# Patient Record
Sex: MALE | Race: WHITE | NOT HISPANIC OR LATINO | Employment: OTHER | URBAN - METROPOLITAN AREA
[De-identification: names, ages, dates, MRNs, and addresses within clinical notes are randomized per-mention and may not be internally consistent; named-entity substitution may affect disease eponyms.]

---

## 2024-04-29 DIAGNOSIS — Z78.9 NEED FOR FOLLOW-UP BY SOCIAL WORKER: Primary | ICD-10-CM

## 2024-05-04 ENCOUNTER — HOME HEALTH ADMISSION (OUTPATIENT)
Dept: HOME HEALTH SERVICES | Facility: HOME HEALTH | Age: 64
End: 2024-05-04
Payer: MEDICARE

## 2024-05-04 ENCOUNTER — DOCUMENTATION (OUTPATIENT)
Dept: HOME HEALTH SERVICES | Facility: HOME HEALTH | Age: 64
End: 2024-05-04
Payer: MEDICARE

## 2024-05-04 NOTE — HH CARE COORDINATION
Home Care received a Referral for Nursing, Physical Therapy, Occupational Therapy, and Speech Language Pathology. We have processed the referral for a Start of Care on 5/7-5/8.     If you have any questions or concerns, please feel free to contact us at [phone]. Follow the prompts, enter your five digit zip code, and you will be directed to your care team on [facility].

## 2024-05-14 ENCOUNTER — OFFICE VISIT (OUTPATIENT)
Dept: PRIMARY CARE | Facility: CLINIC | Age: 64
End: 2024-05-14
Payer: MEDICARE

## 2024-05-14 VITALS
HEART RATE: 75 BPM | DIASTOLIC BLOOD PRESSURE: 62 MMHG | HEIGHT: 72 IN | SYSTOLIC BLOOD PRESSURE: 128 MMHG | RESPIRATION RATE: 18 BRPM | OXYGEN SATURATION: 95 % | BODY MASS INDEX: 34.31 KG/M2

## 2024-05-14 DIAGNOSIS — N52.9 ERECTILE DYSFUNCTION, UNSPECIFIED ERECTILE DYSFUNCTION TYPE: ICD-10-CM

## 2024-05-14 DIAGNOSIS — F32.A DEPRESSION, UNSPECIFIED DEPRESSION TYPE: ICD-10-CM

## 2024-05-14 DIAGNOSIS — F51.01 PRIMARY INSOMNIA: Primary | ICD-10-CM

## 2024-05-14 DIAGNOSIS — R29.6 FREQUENT FALLS: ICD-10-CM

## 2024-05-14 DIAGNOSIS — I10 PRIMARY HYPERTENSION: ICD-10-CM

## 2024-05-14 DIAGNOSIS — G20.A1 PARKINSON'S DISEASE, UNSPECIFIED WHETHER DYSKINESIA PRESENT, UNSPECIFIED WHETHER MANIFESTATIONS FLUCTUATE (MULTI): ICD-10-CM

## 2024-05-14 PROBLEM — F41.9 ANXIETY: Status: ACTIVE | Noted: 2022-12-19

## 2024-05-14 PROBLEM — R97.20 ELEVATED PSA: Status: ACTIVE | Noted: 2022-05-17

## 2024-05-14 PROBLEM — F41.8 DEPRESSION WITH ANXIETY: Status: ACTIVE | Noted: 2024-05-14

## 2024-05-14 PROBLEM — F31.70 BIPOLAR AFFECTIVE DISORDER IN REMISSION (MULTI): Status: ACTIVE | Noted: 2018-08-17

## 2024-05-14 PROCEDURE — 3074F SYST BP LT 130 MM HG: CPT | Performed by: NURSE PRACTITIONER

## 2024-05-14 PROCEDURE — 1159F MED LIST DOCD IN RCRD: CPT | Performed by: NURSE PRACTITIONER

## 2024-05-14 PROCEDURE — 99214 OFFICE O/P EST MOD 30 MIN: CPT | Performed by: NURSE PRACTITIONER

## 2024-05-14 PROCEDURE — 3078F DIAST BP <80 MM HG: CPT | Performed by: NURSE PRACTITIONER

## 2024-05-14 RX ORDER — LISINOPRIL 10 MG/1
10 TABLET ORAL DAILY
Qty: 30 TABLET | Refills: 1 | Status: SHIPPED | OUTPATIENT
Start: 2024-05-14 | End: 2024-07-13

## 2024-05-14 RX ORDER — SILDENAFIL 100 MG/1
100 TABLET, FILM COATED ORAL
COMMUNITY
Start: 2021-03-18 | End: 2024-05-14 | Stop reason: SDUPTHER

## 2024-05-14 RX ORDER — QUETIAPINE FUMARATE 300 MG/1
300 TABLET, FILM COATED ORAL NIGHTLY
Qty: 30 TABLET | Refills: 5 | Status: SHIPPED | OUTPATIENT
Start: 2024-05-14 | End: 2024-11-10

## 2024-05-14 RX ORDER — SILDENAFIL 100 MG/1
100 TABLET, FILM COATED ORAL AS NEEDED
Qty: 10 TABLET | Refills: 0 | Status: SHIPPED | OUTPATIENT
Start: 2024-05-14

## 2024-05-14 RX ORDER — HYDROCHLOROTHIAZIDE 25 MG/1
25 TABLET ORAL
COMMUNITY

## 2024-05-14 RX ORDER — QUETIAPINE FUMARATE 300 MG/1
300 TABLET, FILM COATED ORAL NIGHTLY
COMMUNITY
Start: 2024-02-15 | End: 2024-05-14 | Stop reason: SDUPTHER

## 2024-05-14 RX ORDER — CARBIDOPA AND LEVODOPA 25; 100 MG/1; MG/1
2 TABLET ORAL 4 TIMES DAILY
COMMUNITY
Start: 2024-03-22

## 2024-05-14 RX ORDER — LISINOPRIL 10 MG/1
10 TABLET ORAL
COMMUNITY
Start: 2024-05-06 | End: 2024-05-14 | Stop reason: SDUPTHER

## 2024-05-14 RX ORDER — ESCITALOPRAM OXALATE 10 MG/1
10 TABLET ORAL
Qty: 30 TABLET | Refills: 11 | Status: SHIPPED | OUTPATIENT
Start: 2024-05-14 | End: 2025-05-09

## 2024-05-14 RX ORDER — DIVALPROEX SODIUM 500 MG/1
500 TABLET, DELAYED RELEASE ORAL
COMMUNITY
Start: 2023-12-06

## 2024-05-14 RX ORDER — ESCITALOPRAM OXALATE 10 MG/1
10 TABLET ORAL
COMMUNITY
Start: 2023-12-06 | End: 2024-05-14 | Stop reason: SDUPTHER

## 2024-05-14 ASSESSMENT — ENCOUNTER SYMPTOMS
AGITATION: 0
FEVER: 0
DIARRHEA: 0
SLEEP DISTURBANCE: 0
VOMITING: 0
LOSS OF SENSATION IN FEET: 0
WEAKNESS: 0
NERVOUS/ANXIOUS: 0
FATIGUE: 0
CONSTIPATION: 0
NAUSEA: 0
OCCASIONAL FEELINGS OF UNSTEADINESS: 1
SHORTNESS OF BREATH: 0
COUGH: 0
DEPRESSION: 0

## 2024-05-14 NOTE — PATIENT INSTRUCTIONS

## 2024-05-14 NOTE — PROGRESS NOTES
Subjective   Patient ID: Clark Golden is a 64 y.o. male who presents for Hospital Follow-up (Pt has Parkinson).    Here for hospital follow up, recently discharged from [facility] on 4/23/24 after a fall, found by his ex wife, he suffered a RT ulnar fracture. He currently lives alone, his ex wife and friend check in on him. He is using a wheelchair for safety until he gets his strength back. He is home bound, needs help to leave the home and currently does not drive.   He follows with a neurologist for his Parkinson's         Review of Systems   Constitutional:  Negative for fatigue and fever.   Respiratory:  Negative for cough and shortness of breath.    Cardiovascular:  Negative for chest pain and leg swelling.   Gastrointestinal:  Negative for constipation, diarrhea, nausea and vomiting.   Skin:  Negative for pallor and rash.   Neurological:  Negative for weakness.   Psychiatric/Behavioral:  Negative for agitation, behavioral problems and sleep disturbance. The patient is not nervous/anxious.        Objective   /62   Pulse 75   Resp 18   Ht 1.829 m (6')   SpO2 95%   BMI 34.31 kg/m²     Physical Exam  Vitals and nursing note reviewed.   Constitutional:       General: He is not in acute distress.  HENT:      Head: Normocephalic and atraumatic.   Eyes:      Pupils: Pupils are equal, round, and reactive to light.   Cardiovascular:      Rate and Rhythm: Normal rate and regular rhythm.   Pulmonary:      Effort: Pulmonary effort is normal.      Breath sounds: Normal breath sounds.   Skin:     General: Skin is warm and dry.   Neurological:      Mental Status: He is alert and oriented to person, place, and time.      Comments: Lt arm/hand tremor   Psychiatric:         Mood and Affect: Mood normal.         Assessment/Plan   Problem List Items Addressed This Visit             ICD-10-CM    Frequent falls R29.6    Relevant Orders    Referral to Physical Therapy    Insomnia - Primary G47.00    Relevant Medications     QUEtiapine (SEROquel) 300 mg tablet    Parkinson's disease (Multi) G20.A1    Relevant Orders    Referral to Physical Therapy     Other Visit Diagnoses         Codes    Primary hypertension     I10    Relevant Medications    lisinopril 10 mg tablet    Depression, unspecified depression type     F32.A    Relevant Medications    escitalopram (Lexapro) 10 mg tablet    Erectile dysfunction, unspecified erectile dysfunction type     N52.9    Relevant Medications    sildenafil (Viagra) 100 mg tablet             Patient was identified as a fall risk. Risk prevention instructions provided.

## 2024-06-07 ENCOUNTER — EVALUATION (OUTPATIENT)
Dept: PHYSICAL THERAPY | Facility: CLINIC | Age: 64
End: 2024-06-07
Payer: MEDICARE

## 2024-06-07 DIAGNOSIS — G20.A1 PARKINSON'S DISEASE, UNSPECIFIED WHETHER DYSKINESIA PRESENT, UNSPECIFIED WHETHER MANIFESTATIONS FLUCTUATE (MULTI): ICD-10-CM

## 2024-06-07 DIAGNOSIS — R68.89 DIFFICULTY TRANSFERRING: ICD-10-CM

## 2024-06-07 DIAGNOSIS — R26.89 POOR BALANCE: ICD-10-CM

## 2024-06-07 DIAGNOSIS — R29.6 FREQUENT FALLS: ICD-10-CM

## 2024-06-07 DIAGNOSIS — R26.9 IMPAIRED GAIT: ICD-10-CM

## 2024-06-07 DIAGNOSIS — R26.89 PRIMARY FREEZING OF GAIT: Primary | ICD-10-CM

## 2024-06-07 PROCEDURE — 97162 PT EVAL MOD COMPLEX 30 MIN: CPT | Mod: GP

## 2024-06-07 ASSESSMENT — BALANCE ASSESSMENTS
PLACE ALTERNATE FOOT ON STEP OR STOOL WHILE STANDING UNSUPPORTED: LOOKS BEHIND ONE SIDE ONLY OTHER SIDE SHOWS LESS WEIGHT SHIFT
STANDING UNSUPPORTED WITH EYES CLOSED: ABLE TO STAND 10 SECONDS WITH SUPERVISION
STANDING TO SITTING: NEEDS MINIMAL AID TO STAND OR STABILIZE
PLACE ALTERNATE FOOT ON STEP OR STOOL WHILE STANDING UNSUPPORTED: NEEDS ASSISTANCE TO KEEP FROM FALLING/UNABLE TO TRY
REACHING FORWARD WITH OUTSTRETCHED ARM WHILE STANDING: CAN REACH FORWARD 12 CM (5 INCHES)
SITTING WITH BACK UNSUPPORTED BUT FEET SUPPORTED ON FLOOR OR ON A STOOL: ABLE TO SIT SAFELY AND SECURELY FOR 2 MINUTES
TURN 360 DEGREES: NEEDS ASSISTANCE WHILE TURNING
STANDING ON ONE LEG: UNABLE TO TRY NEEDS ASSIST TO PREVENT FALL
STANDING UNSUPPORTED: ABLE TO STAND 2 MINUTES WITH SUPERVISION
STANDING UNSUPPORTED WITH FEET TOGETHER: NEEDS HELP TO ATTAIN POSITION BUT ABLE TO STAND 15 SECONDS FEET TOGETHER
PICK UP OBJECT FROM THE FLOOR FROM A STANDING POSITION: UNABLE TO TRY/NEEDS ASSIST TO KEEP FROM LOSING BALANCE OR FALLING
STANDING UNSUPPORTED ONE FOOT IN FRONT: LOSES BALANCE WHILE STEPPING OR STANDING
STANDING TO SITTING: USES BACK OF LEGS AGAINST CHAIR TO CONTROL DESCENT
LONG VERSION TOTAL SCORE (MAX 56): 21
TRANSFERS: NEEDS ONE PERSON TO ASSIST

## 2024-06-07 ASSESSMENT — ENCOUNTER SYMPTOMS
LOSS OF SENSATION IN FEET: 0
OCCASIONAL FEELINGS OF UNSTEADINESS: 1
DEPRESSION: 0

## 2024-06-07 ASSESSMENT — PATIENT HEALTH QUESTIONNAIRE - PHQ9
1. LITTLE INTEREST OR PLEASURE IN DOING THINGS: NOT AT ALL
2. FEELING DOWN, DEPRESSED OR HOPELESS: NOT AT ALL
SUM OF ALL RESPONSES TO PHQ9 QUESTIONS 1 AND 2: 0

## 2024-06-07 ASSESSMENT — PAIN - FUNCTIONAL ASSESSMENT: PAIN_FUNCTIONAL_ASSESSMENT: 0-10

## 2024-06-07 ASSESSMENT — PAIN SCALES - GENERAL: PAINLEVEL_OUTOF10: 0 - NO PAIN

## 2024-06-07 NOTE — PROGRESS NOTES
Physical Therapy    Physical Therapy Evaluation and Treatment      Patient Name: Clark Golden  MRN: [id number]  Today's Date: 6/7/2024  Visit #1  Time Calculation  Start Time: 0848  Stop Time: 0932  Time Calculation (min): 44 min    Insurance:  2024  90 DAY 9/7/2024 $174.09 USED     Assessment:  PT Assessment Results: Decreased strength, Impaired balance, Decreased mobility, Decreased coordination, Impaired judgement, Decreased safety awareness  Rehab Prognosis: Fair  Pt is a 64 year old male presenting to PT with Parkinsons. Standardized testing of TUG, 10 meter walk, gait analysis, transfer abilities and SAGASTUME balance reveal that pt has multiple impairments in body structures/functions, activity limitations and participation restrictions. These include subjective and objective findings such as poor balance, frequent freezing of gait, assistance with ambulation/transfers, poor safety awareness and altered gait mechanics. The patient has personal factors and comorbidities that may serve as barriers affecting the plan of care, including medical co morbidities such as but not limited to Parkinsons, HTN, Bipolar, depression and anxiety.  Pt to benefit from skilled PT interventions to improve functional mobility deficits to improve independence and decrease fall risk.       Plan:  Treatment/Interventions: Education/ Instruction, Gait training, Neuromuscular re-education, Therapeutic activities, Therapeutic exercises, Self care/ home management  PT Plan: Skilled PT  PT Frequency: 2 times per week  Duration: 8-10 weeks      Current Problem:   1. Primary freezing of gait        2. Parkinson's disease, unspecified whether dyskinesia present, unspecified whether manifestations fluctuate (Multi)  Referral to Physical Therapy      3. Frequent falls  Referral to Physical Therapy      4. Impaired gait        5. Poor balance        6. Difficulty transferring            Subjective     Pt reports to PT via wheelchair. Pt diagnosed  "with Parkinsons ~ 7-8 years ago. Pt had DBS placed a year ago which pt reports working pretty well. Pt has had frequent falls over the last year. Most recently pt sustained a fall in March resulting in an ulnar fracture. Pt then found down again in April and went to [facility] for 2 weeks. Currently, pt with impaired mobility, utilizing wheelchair > 50 % of the time for functional mobility due to fear of falls and decreased balance.   General:     Vital Signs:     Pain:   Pain Assessment  Pain Assessment: 0-10  Pain Score: 0 - No pain  Home Living:  Pt lives in a single level condo alone  Prior Level of Function:  Functional mobility; independent without a device in condo however due to frequent falls relies on wheelchair 50% of the time  ADLs; independent  (-) Driving  Patient goal;  \"To be my best self\"      Objective   Right lower extremity  Hip flexion; 4-/5  Hip abduction; 4-/5  Hip adduction; 4-/5   Knee flexion; 4/5  Knee extension; 4/5  Ankle dorsiflexion; 4/5  Ankle plantarflexion; 4/5    Left lower extremity  Hip flexion; 3+/5  Hip abduction; 4-/5  Hip adduction; 4-/5   Knee flexion; 4/5  Knee extension; 4/5  Ankle dorsiflexion; 4/5  Ankle plantarflexion; 4/5    Sensation:  Denies N/T    Coordination  LAUREL; intact    Gait  Pt amb with short/shuffled steps, narrow ROLA, no arm swing, no trunk/pelvic rotation. Pt freezing > 75% of the time, especially with initiation and turns. Pt requests a foot placed in front of feet which \"unfreezes\" gait.     Transfers  Pt with poor safety awareness, especially with pivots, frequently reaching for chair prematurely and crossing feet. Requires up to min assist      Outcome Measures:  Dillard Balance Scale  1. Sitting to Standing: Needs minimal aid to stand or stabilize  2. Standing Unsupported: Able to stand 2 minutes with supervision  3. Sitting with Back Unsupported but Feet Supported on Floor or on a Stool: Able to sit safely and securely for 2 minutes  4. Standing to " Sitting: Uses back of legs against chair to control descent  5.  Transfers: Needs one person to assist  6. Standing Unsupported with Eyes Closed: Able to stand 10 seconds with supervision  7. Standing Unsupported with Feet Together: Needs help to attain position but able to stand 15 seconds feet together  8. Reach Forward with Outstretched Arm While Standing: Can reach forward 12 cm (5 inches)  9.  Object from Floor from a Standing Position: Unable to try/needs assist to keep from losing balance or falling  10. Turning to Look Behind Over Left and Right Shoulders While Standing: Looks behind one side only other side shows less weight shift  11. Turn 360 Degrees: Needs assistance while turning  12. Place Alternate Foot on Step or Stool While Standing Unsupported: Needs assistance to keep from falling/unable to try  13. Standing Unsupported One Foot in Front: Loses balance while stepping or standing  14. Standing on One Leg: Unable to try needs assist to prevent fall  Sagastume Balance Score: 21    Timed Up and Go Test  How many seconds did it take to complete the 5 tasks?: 71 seconds (without a device)    Other Measures  10M Walk Test: .66 m/sec (without a device)  Activities - Specific Balance Confidence Scale: 920     Treatments:  Education and discussion on treatment regarding the benefits related to current condition, POC, pathophysiology, and precautions. Discussed significant concerns regarding pt current mobility with both pt and pt's brother. Pt requires intensive therapy at this time, at least 2x/week which is challenging with pt inability to drive and relying on others. Pt vocalizing understanding of therapist concerns.         Goals:  Pt will improve SAGASTUME by 4 points to meet RUSS  Pt will improve TUG to < 60 seconds to decrease fall risk  Pt will demonstrate independence with HEP  Pt will report 0 falls during POC  Pt will increase gait speed to .7m/sec to progress toward community ambulatory speed

## 2024-06-15 DIAGNOSIS — I10 PRIMARY HYPERTENSION: ICD-10-CM

## 2024-06-17 RX ORDER — LISINOPRIL 10 MG/1
10 TABLET ORAL DAILY
Qty: 90 TABLET | Refills: 3 | Status: SHIPPED | OUTPATIENT
Start: 2024-06-17

## 2024-07-05 DIAGNOSIS — N52.9 ERECTILE DYSFUNCTION, UNSPECIFIED ERECTILE DYSFUNCTION TYPE: ICD-10-CM

## 2024-07-08 RX ORDER — SILDENAFIL 100 MG/1
100 TABLET, FILM COATED ORAL AS NEEDED
Qty: 10 TABLET | Refills: 3 | Status: SHIPPED | OUTPATIENT
Start: 2024-07-08

## 2024-08-28 ENCOUNTER — TELEPHONE (OUTPATIENT)
Dept: PRIMARY CARE | Facility: CLINIC | Age: 64
End: 2024-08-28
Payer: MEDICARE

## 2024-08-28 NOTE — TELEPHONE ENCOUNTER
Patient is requesting a phone call.  He thinks he may have a hernia.  He is unsure of what to do.    Please call

## 2024-08-28 NOTE — TELEPHONE ENCOUNTER
Please call patient and get him scheduled with Jahaira  She will need to check him to see if any bulging

## 2024-10-22 ENCOUNTER — PATIENT OUTREACH (OUTPATIENT)
Dept: CARE COORDINATION | Facility: CLINIC | Age: 64
End: 2024-10-22
Payer: MEDICARE

## 2024-10-22 NOTE — PROGRESS NOTES
Calling patient to discuss scheduling Medicare wellness PCP. Unable to leave voicemail at this time.     I was contact patient to introduce myself as the Patient Navigator and my role.  I am here to provide any help that might need with issues with appointments, medications assistance and help with any community assistance programs.     Patient is able to directly contact me if any issues are brought to the providers attention. My direct number is [phone].